# Patient Record
Sex: MALE | Race: WHITE | NOT HISPANIC OR LATINO | Employment: OTHER | URBAN - METROPOLITAN AREA
[De-identification: names, ages, dates, MRNs, and addresses within clinical notes are randomized per-mention and may not be internally consistent; named-entity substitution may affect disease eponyms.]

---

## 2018-06-06 ENCOUNTER — APPOINTMENT (EMERGENCY)
Dept: CT IMAGING | Facility: HOSPITAL | Age: 58
End: 2018-06-06
Payer: COMMERCIAL

## 2018-06-06 ENCOUNTER — HOSPITAL ENCOUNTER (OUTPATIENT)
Facility: HOSPITAL | Age: 58
Setting detail: OBSERVATION
Discharge: HOME | End: 2018-06-07
Attending: EMERGENCY MEDICINE | Admitting: INTERNAL MEDICINE
Payer: COMMERCIAL

## 2018-06-06 ENCOUNTER — APPOINTMENT (EMERGENCY)
Dept: RADIOLOGY | Facility: HOSPITAL | Age: 58
End: 2018-06-06
Payer: COMMERCIAL

## 2018-06-06 DIAGNOSIS — R94.31 ABNORMAL EKG: ICD-10-CM

## 2018-06-06 DIAGNOSIS — S29.9XXA TRAUMA OF CHEST, INITIAL ENCOUNTER: Primary | ICD-10-CM

## 2018-06-06 PROBLEM — I48.91 ATRIAL FIBRILLATION (HCC): Status: ACTIVE | Noted: 2018-06-06

## 2018-06-06 LAB
ANION GAP BLD CALC-SCNC: 13 MMOL/L (ref 4–13)
BUN BLD-MCNC: 34 MG/DL (ref 5–25)
CA-I BLD-SCNC: 1.16 MMOL/L (ref 1.12–1.32)
CHLORIDE BLD-SCNC: 102 MMOL/L (ref 100–108)
CREAT BLD-MCNC: 0.8 MG/DL (ref 0.6–1.3)
GFR SERPL CREATININE-BSD FRML MDRD: 98 ML/MIN/1.73SQ M
GLUCOSE SERPL-MCNC: 102 MG/DL (ref 65–140)
HCT VFR BLD CALC: 47 % (ref 36.5–49.3)
HGB BLDA-MCNC: 16 G/DL (ref 12–17)
PCO2 BLD: 31 MMOL/L (ref 21–32)
POTASSIUM BLD-SCNC: 5.1 MMOL/L (ref 3.5–5.3)
SODIUM BLD-SCNC: 139 MMOL/L (ref 136–145)
SPECIMEN SOURCE: ABNORMAL
SPECIMEN SOURCE: NORMAL
TROPONIN I BLD-MCNC: 0.01 NG/ML (ref 0–0.08)

## 2018-06-06 PROCEDURE — 84484 ASSAY OF TROPONIN QUANT: CPT

## 2018-06-06 PROCEDURE — 70450 CT HEAD/BRAIN W/O DYE: CPT

## 2018-06-06 PROCEDURE — 93005 ELECTROCARDIOGRAM TRACING: CPT

## 2018-06-06 PROCEDURE — 71260 CT THORAX DX C+: CPT

## 2018-06-06 PROCEDURE — 71045 X-RAY EXAM CHEST 1 VIEW: CPT

## 2018-06-06 PROCEDURE — 85014 HEMATOCRIT: CPT

## 2018-06-06 PROCEDURE — 74177 CT ABD & PELVIS W/CONTRAST: CPT

## 2018-06-06 PROCEDURE — 90715 TDAP VACCINE 7 YRS/> IM: CPT | Performed by: EMERGENCY MEDICINE

## 2018-06-06 PROCEDURE — 80047 BASIC METABLC PNL IONIZED CA: CPT

## 2018-06-06 RX ORDER — MORPHINE SULFATE 4 MG/ML
4 INJECTION, SOLUTION INTRAMUSCULAR; INTRAVENOUS ONCE
Status: DISCONTINUED | OUTPATIENT
Start: 2018-06-06 | End: 2018-06-07 | Stop reason: HOSPADM

## 2018-06-06 RX ADMIN — SODIUM CHLORIDE 1000 ML: 0.9 INJECTION, SOLUTION INTRAVENOUS at 20:57

## 2018-06-06 RX ADMIN — TETANUS TOXOID, REDUCED DIPHTHERIA TOXOID AND ACELLULAR PERTUSSIS VACCINE, ADSORBED 0.5 ML: 5; 2.5; 8; 8; 2.5 SUSPENSION INTRAMUSCULAR at 21:00

## 2018-06-06 RX ADMIN — IOHEXOL 100 ML: 350 INJECTION, SOLUTION INTRAVENOUS at 21:21

## 2018-06-07 ENCOUNTER — APPOINTMENT (OUTPATIENT)
Dept: NON INVASIVE DIAGNOSTICS | Facility: HOSPITAL | Age: 58
End: 2018-06-07
Payer: COMMERCIAL

## 2018-06-07 VITALS
BODY MASS INDEX: 25.88 KG/M2 | HEIGHT: 70 IN | SYSTOLIC BLOOD PRESSURE: 127 MMHG | RESPIRATION RATE: 18 BRPM | WEIGHT: 180.78 LBS | OXYGEN SATURATION: 96 % | HEART RATE: 58 BPM | DIASTOLIC BLOOD PRESSURE: 78 MMHG | TEMPERATURE: 98 F

## 2018-06-07 LAB
ANION GAP SERPL CALCULATED.3IONS-SCNC: 7 MMOL/L (ref 4–13)
ATRIAL RATE: 54 BPM
ATRIAL RATE: 66 BPM
BUN SERPL-MCNC: 21 MG/DL (ref 5–25)
CALCIUM SERPL-MCNC: 8.4 MG/DL (ref 8.3–10.1)
CHLORIDE SERPL-SCNC: 109 MMOL/L (ref 100–108)
CHOLEST SERPL-MCNC: 160 MG/DL (ref 50–200)
CO2 SERPL-SCNC: 28 MMOL/L (ref 21–32)
CREAT SERPL-MCNC: 0.82 MG/DL (ref 0.6–1.3)
GFR SERPL CREATININE-BSD FRML MDRD: 97 ML/MIN/1.73SQ M
GLUCOSE P FAST SERPL-MCNC: 127 MG/DL (ref 65–99)
GLUCOSE SERPL-MCNC: 127 MG/DL (ref 65–140)
HDLC SERPL-MCNC: 64 MG/DL (ref 40–60)
LDLC SERPL CALC-MCNC: 82 MG/DL (ref 0–100)
P AXIS: 29 DEGREES
P AXIS: 54 DEGREES
POTASSIUM SERPL-SCNC: 3.7 MMOL/L (ref 3.5–5.3)
PR INTERVAL: 142 MS
PR INTERVAL: 144 MS
QRS AXIS: -75 DEGREES
QRS AXIS: -78 DEGREES
QRSD INTERVAL: 116 MS
QRSD INTERVAL: 120 MS
QT INTERVAL: 416 MS
QT INTERVAL: 420 MS
QTC INTERVAL: 398 MS
QTC INTERVAL: 436 MS
SODIUM SERPL-SCNC: 144 MMOL/L (ref 136–145)
T WAVE AXIS: 27 DEGREES
T WAVE AXIS: 44 DEGREES
TRIGL SERPL-MCNC: 71 MG/DL
TROPONIN I SERPL-MCNC: <0.02 NG/ML
VENTRICULAR RATE: 54 BPM
VENTRICULAR RATE: 66 BPM

## 2018-06-07 PROCEDURE — 93010 ELECTROCARDIOGRAM REPORT: CPT | Performed by: INTERNAL MEDICINE

## 2018-06-07 PROCEDURE — 93005 ELECTROCARDIOGRAM TRACING: CPT

## 2018-06-07 PROCEDURE — 99236 HOSP IP/OBS SAME DATE HI 85: CPT | Performed by: INTERNAL MEDICINE

## 2018-06-07 PROCEDURE — 80048 BASIC METABOLIC PNL TOTAL CA: CPT | Performed by: NURSE PRACTITIONER

## 2018-06-07 PROCEDURE — 99285 EMERGENCY DEPT VISIT HI MDM: CPT

## 2018-06-07 PROCEDURE — 84484 ASSAY OF TROPONIN QUANT: CPT | Performed by: INTERNAL MEDICINE

## 2018-06-07 PROCEDURE — 80061 LIPID PANEL: CPT | Performed by: NURSE PRACTITIONER

## 2018-06-07 RX ORDER — ACETAMINOPHEN 325 MG/1
650 TABLET ORAL EVERY 4 HOURS PRN
Status: DISCONTINUED | OUTPATIENT
Start: 2018-06-07 | End: 2018-06-07 | Stop reason: HOSPADM

## 2018-06-07 RX ORDER — IBUPROFEN 200 MG
200 TABLET ORAL EVERY 6 HOURS PRN
Refills: 0
Start: 2018-06-07

## 2018-06-07 RX ORDER — OXYCODONE HYDROCHLORIDE AND ACETAMINOPHEN 5; 325 MG/1; MG/1
1 TABLET ORAL EVERY 4 HOURS PRN
Status: DISCONTINUED | OUTPATIENT
Start: 2018-06-07 | End: 2018-06-07 | Stop reason: HOSPADM

## 2018-06-07 RX ORDER — ONDANSETRON 2 MG/ML
4 INJECTION INTRAMUSCULAR; INTRAVENOUS EVERY 6 HOURS PRN
Status: DISCONTINUED | OUTPATIENT
Start: 2018-06-07 | End: 2018-06-07 | Stop reason: HOSPADM

## 2018-06-07 RX ADMIN — ACETAMINOPHEN 650 MG: 325 TABLET, FILM COATED ORAL at 01:07
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
## 2018-06-07 NOTE — TRAUMA DOCUMENTATION
Patient aware the doctor is awaiting to hear back from the trauma doctor    IVF infusing, call bell within reach
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
## 2018-06-07 NOTE — ASSESSMENT & PLAN NOTE
EKG shows incomplete right bundle branch block  Patient unsure if this is new  According to cardiology consult from a [facility] on 05/14/2016 patient had right bundle branch block at that time  Monitor on telemetry  EKG with chest pain or change in rhythm  Inpatient consult Cardiology  Will hold off on echo for now  [address] Cardiology input

## 2018-06-07 NOTE — ASSESSMENT & PLAN NOTE
No obvious injury noted to chest wall  Patient has tenderness to right ribs  Tylenol p r n  mild pain and Percocet p r n  moderate pain  Encouraged deep breathing

## 2018-06-07 NOTE — DISCHARGE SUMMARY
Discharge Summary  Louisa Preciado 59 y o  male MRN: [id number]  Unit/Bed#: [address] [place] [address] Encounter: [id number]    Admission Date:   Admission Orders     Ordered        06/06/18 2322  Place in Observation (expected length of stay for this patient is less than two midnights)  Once               Discharge Date: 06/07/18    Admitting Diagnosis: Abnormal EKG [R94 31]  H/O chest wall injury [Z87 828]  Trauma of chest, initial encounter [S29  9XXA]    HPI: see  H and P    Procedures Performed: No orders of the defined types were placed in this encounter  Hospital Course: Pt admitted for observationof chest wall trauma -R side of chest  Had RBBB-this is not an indication for admission  Significant Findings, Care, Treatment and Services Provided: none    Discharge Diagnosis: Principal Problem:    Abnormal EKG  Active Problems:    Chest wall injury, initial encounter    Atrial fibrillation ([facility] [address] )  Resolved Problems:    * No resolved hospital problems  *        Condition at Discharge: stable     Discharge instructions/Information to patient and family:   See after visit summary for information provided to patient and family  Provisions for Follow-Up Care:  See after visit summary for information related to follow-up care and any pertinent home health orders  Disposition: Home    Discharge Medications:  See after visit summary for reconciled discharge medications provided to patient and family  This note has been constructed using a voice recognition system         Reynaldo Jane MD
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
## 2018-06-07 NOTE — TRAUMA DOCUMENTATION
Pt resting; has no c/o  Pt and wife cold as room is very cold; warm blankets given to both and I informed them I would move him to a different room when one opened

## 2018-06-07 NOTE — SOCIAL WORK
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

## 2018-06-07 NOTE — PLAN OF CARE
CARDIOVASCULAR - ADULT     Absence of cardiac dysrhythmias or at baseline rhythm Progressing        DISCHARGE PLANNING     Discharge to home or other facility with appropriate resources Progressing        INFECTION - ADULT     Absence or prevention of progression during hospitalization Progressing     Absence of fever/infection during neutropenic period Progressing        Knowledge Deficit     Patient/family/caregiver demonstrates understanding of disease process, treatment plan, medications, and discharge instructions Progressing        MUSCULOSKELETAL - ADULT     Maintain or return mobility to safest level of function Progressing     Maintain proper alignment of affected body part Progressing        PAIN - ADULT     Verbalizes/displays adequate comfort level or baseline comfort level Progressing        SAFETY ADULT     Patient will remain free of falls Progressing     Maintain or return to baseline ADL function Progressing     Maintain or return mobility status to optimal level Progressing        SKIN/TISSUE INTEGRITY - ADULT     Incision(s), wounds(s) or drain site(s) healing without S/S of infection Progressing

## 2020-11-17 ENCOUNTER — NURSE TRIAGE (OUTPATIENT)
Dept: OTHER | Facility: OTHER | Age: 60
End: 2020-11-17

## 2020-11-17 DIAGNOSIS — Z20.828 SARS-ASSOCIATED CORONAVIRUS EXPOSURE: ICD-10-CM

## 2020-11-17 DIAGNOSIS — Z20.828 SARS-ASSOCIATED CORONAVIRUS EXPOSURE: Primary | ICD-10-CM

## 2020-11-17 PROCEDURE — U0003 INFECTIOUS AGENT DETECTION BY NUCLEIC ACID (DNA OR RNA); SEVERE ACUTE RESPIRATORY SYNDROME CORONAVIRUS 2 (SARS-COV-2) (CORONAVIRUS DISEASE [COVID-19]), AMPLIFIED PROBE TECHNIQUE, MAKING USE OF HIGH THROUGHPUT TECHNOLOGIES AS DESCRIBED BY CMS-2020-01-R: HCPCS | Performed by: FAMILY MEDICINE

## 2020-11-18 LAB — SARS-COV-2 RNA SPEC QL NAA+PROBE: NOT DETECTED

## 2021-02-12 ENCOUNTER — OFFICE VISIT (OUTPATIENT)
Dept: CARDIOLOGY CLINIC | Facility: CLINIC | Age: 61
End: 2021-02-12
Payer: COMMERCIAL

## 2021-02-12 VITALS
SYSTOLIC BLOOD PRESSURE: 120 MMHG | BODY MASS INDEX: 25.91 KG/M2 | WEIGHT: 181 LBS | DIASTOLIC BLOOD PRESSURE: 80 MMHG | HEIGHT: 70 IN | HEART RATE: 68 BPM

## 2021-02-12 DIAGNOSIS — I48.0 PAROXYSMAL ATRIAL FIBRILLATION (HCC): Primary | ICD-10-CM

## 2021-02-12 PROCEDURE — 93000 ELECTROCARDIOGRAM COMPLETE: CPT | Performed by: INTERNAL MEDICINE

## 2021-02-12 PROCEDURE — 99244 OFF/OP CNSLTJ NEW/EST MOD 40: CPT | Performed by: INTERNAL MEDICINE

## 2021-02-12 RX ORDER — DILTIAZEM HYDROCHLORIDE 120 MG/1
120 CAPSULE, COATED, EXTENDED RELEASE ORAL DAILY
COMMUNITY
Start: 2021-01-24 | End: 2021-02-12 | Stop reason: ALTCHOICE

## 2021-02-12 NOTE — PATIENT INSTRUCTIONS
A-fib (Atrial Fibrillation)   AMBULATORY CARE:   Atrial fibrillation (a-fib)  is an irregular heartbeat  It reduces your heart's ability to pump blood through your body  A-fib may come and go, or it may be a long-term condition  A-fib can cause life-threatening blood clots, stroke, or heart failure  It is important to treat and manage a-fib to help prevent these problems  Common signs and symptoms include the following:   · A heartbeat that races, pounds, or flutters    · Weakness, severe tiredness, or confusion    · Feeling lightheaded, sweaty, dizzy, or faint    · Shortness of breath or anxiety    · Chest pain or pressure    Call your local emergency number (911 in the [address] [facility]) if:   · You have any of the following signs of a heart attack:      ? Squeezing, pressure, or pain in your chest    ? You may  also have any of the following:     ? Discomfort or pain in your back, neck, jaw, stomach, or arm    ? Shortness of breath    ? Nausea or vomiting    ? Lightheadedness or a sudden cold sweat    · You have any of the following signs of a stroke:      ? Numbness or drooping on one side of your face     ? Weakness in an arm or leg    ? Confusion or difficulty speaking    ? Dizziness, a severe headache, or vision loss    Call your cardiologist if:   · Your arm or leg feels warm, tender, and painful  It may look swollen and red  · Your heart rate is more than 110 beats per minute  · You have new or worsening swelling in your legs, feet, ankles, or abdomen  · You are short of breath, even at rest      · You have questions or concerns about your condition or care  Treatment for A-fib:  Conditions that cause a-fib, such as thyroid disease, will be treated  You may also need any of the following:  · Heart medicines  help control your heart rate or rhythm  You may need more than one medicine to treat your symptoms  · Antiplatelet or blood thinner medicines  help prevent blood clots and stroke  · Cardioversion  is a procedure to return your heart rate and rhythm to normal  It can be done using medicines or electric shock  · A-fib ablation  is a procedure that uses energy to burn a small area of heart tissue  This creates scar tissue and prevents electrical signals that cause a-fib  You may need this procedure more than once  Ask for more information on a-fib ablation  · A pacemaker  may be inserted into your heart  A pacemaker is a device that controls your heartbeat  A pacemaker may be inserted during an ablation procedure or surgery  Ask your healthcare provider for more information on pacemakers  · Surgery  may be needed if other procedures do not work  During surgery your healthcare provider will make cuts in the upper part of your heart  The provider will stitch the cuts together to create scar tissue  The scar tissue will prevent electrical signals that cause a-fib  Manage A-fib:   · Know your target heart rate  Learn how to check your pulse and monitor your heart rate  · Know the risks if you choose to drink alcohol  Alcohol can make a-fib hard to manage  Ask your healthcare provider if it is safe for you to drink alcohol  A drink of alcohol is 12 ounces of beer, 5 ounces of wine, or 1½ ounces of liquor  · Do not smoke  Nicotine can cause heart damage and make it more difficult to manage your a-fib  Do not use e-cigarettes or smokeless tobacco in place of cigarettes or to help you quit  They still contain nicotine  Ask your healthcare provider for information if you currently smoke and need help quitting  · Eat heart-healthy foods  Heart healthy foods will help keep your cholesterol low  These include fruits, vegetables, whole-grain breads, low-fat dairy products, beans, lean meats, and fish  Replace butter and margarine with heart-healthy oils such as olive oil and canola oil  · Maintain a healthy weight  Ask your healthcare provider how much you should weigh  Ask him or her to help you create a safe weight loss plan if you are overweight  Even a small goal of a 10% weight loss can improve your heart health  · Get regular physical activity  Physical activity helps improve your heart health  Get at least 150 minutes of moderate aerobic physical activity each week  Your healthcare provider can help you create an activity plan  · Manage other health conditions  This includes high blood pressure or cholesterol, sleep apnea, diabetes, and other heart conditions  Take medicine as directed and follow your treatment plan  Follow up with your cardiologist as directed: You will need regular blood tests and monitoring  Write down your questions so you remember to ask them during your visits  © Copyright [address] Information is for End User's use only and may not be sold, redistributed or otherwise used for commercial purposes  All illustrations and images included in CareNotes® are the copyrighted property of [employer]  or [facility] Markell Stoddard   The above information is an  only  It is not intended as medical advice for individual conditions or treatments  Talk to your doctor, nurse or pharmacist before following any medical regimen to see if it is safe and effective for you

## 2021-02-12 NOTE — PROGRESS NOTES
Cardiology Consultation     Christian Flores  [id number]  1960  [facility] [address] [id number] [phone]    1  Paroxysmal atrial fibrillation (HCC)  POCT ECG    AMB extended holter monitor      Chief Complaint   Patient presents with    Follow-up     was in the hospital with Shruti 1/21/21     HPI: Patient was in the hospital in Jan 2021 for Shruti, and had recurrence of afib  Now on cardizem  He had afib 3 years ago in the setting of trauma and also about 2 weeks prior to COVID infection lasting about 1/2 an hour  Patient feels well, without complaints  No reported chest pain, shortness of breath, palpitations, lightheadedness, syncope, LE edema, orthopnea, PND, or significant weight changes  Patient remains active without any increased fatigue out of the ordinary        Patient Active Problem List   Diagnosis    Abnormal EKG    Chest wall injury, initial encounter    Atrial fibrillation [facility])     Past Medical History:   Diagnosis Date    Atrial fibrillation [facility])      Social History     Socioeconomic History    Marital status: /Civil Union     Spouse name: Not on file    Number of children: Not on file    Years of education: Not on file    Highest education level: Not on file   Occupational History    Not on file   Social Needs    Financial resource strain: Not on file    Food insecurity     Worry: Not on file     Inability: Not on file   [employer] needs     Medical: Not on file     Non-medical: Not on file   Tobacco Use    Smoking status: Never Smoker    Smokeless tobacco: Never Used   Substance and Sexual Activity    Alcohol use: No    Drug use: No    Sexual activity: Not on file   Lifestyle    Physical activity     Days per week: Not on file     Minutes per session: Not on file    Stress: Not on file   Relationships    Social connections Talks on phone: Not on file     Gets together: Not on file     Attends Yazidism service: Not on file     Active member of club or organization: Not on file     Attends meetings of clubs or organizations: Not on file     Relationship status: Not on file    Intimate partner violence     Fear of current or ex partner: Not on file     Emotionally abused: Not on file     Physically abused: Not on file     Forced sexual activity: Not on file   Other Topics Concern    Not on file   Social History Narrative    Not on file      History reviewed  No pertinent family history  Past Surgical History:   Procedure Laterality Date    HERNIA REPAIR         Current Outpatient Medications:     ASPIRIN 81 PO, Take 81 mg by mouth daily, Disp: , Rfl:     ibuprofen (MOTRIN) 200 mg tablet, Take 1 tablet (200 mg total) by mouth every 6 (six) hours as needed for mild pain (Patient not taking: Reported on 2/12/2021), Disp: , Rfl: 0  No Known Allergies  Vitals:    02/12/21 1441   BP: 120/80   BP Location: Left arm   Patient Position: Sitting   Cuff Size: Standard   Pulse: 68   Weight: 82 1 kg (181 lb)   Height: 5' 10" (1 778 m)       Labs:  Orders Only on 11/17/2020   Component Date Value    SARS-CoV-2  11/17/2020 Not Detected      Lab Results   Component Value Date    TRIG 71 06/07/2018    HDL 64 (H) 06/07/2018     Imaging: No results found  Review of Systems:  Review of Systems   Constitutional: Negative for activity change, appetite change, fatigue and fever  HENT: Negative for nosebleeds and sore throat  Eyes: Negative for photophobia and visual disturbance  Respiratory: Negative for cough, chest tightness, shortness of breath and wheezing  Cardiovascular: Negative for chest pain, palpitations and leg swelling  Gastrointestinal: Negative for abdominal pain, diarrhea, nausea and vomiting  Endocrine: Negative for polyuria  Genitourinary: Negative for dysuria, frequency and hematuria     Musculoskeletal: Negative for arthralgias, back pain and gait problem  Skin: Negative for pallor and rash  Neurological: Negative for dizziness, syncope, speech difficulty and light-headedness  Hematological: Does not bruise/bleed easily  Psychiatric/Behavioral: Negative for agitation, behavioral problems and confusion  Physical Exam:  Physical Exam  Vitals signs reviewed  Constitutional:       General: He is not in acute distress  Appearance: He is well-developed  He is not diaphoretic  HENT:      Head: Normocephalic and atraumatic  Nose: Nose normal    Eyes:      General: No scleral icterus  Pupils: Pupils are equal, round, and reactive to light  Neck:      Musculoskeletal: Normal range of motion and neck supple  Vascular: No JVD  Cardiovascular:      Rate and Rhythm: Normal rate and regular rhythm  Heart sounds: S1 normal and S2 normal  Heart sounds not distant  No murmur  No systolic murmur  No friction rub  No gallop  No S3 sounds  Pulmonary:      Effort: Pulmonary effort is normal  No respiratory distress  Breath sounds: Normal breath sounds  No wheezing or rales  Abdominal:      General: Bowel sounds are normal  There is no distension  Palpations: Abdomen is soft  Musculoskeletal:         General: No deformity  Skin:     General: Skin is warm and dry  Findings: No erythema  Neurological:      Mental Status: He is alert and oriented to person, place, and time  Cranial Nerves: No cranial nerve deficit  Psychiatric:         Behavior: Behavior normal        Blood pressure 120/80, pulse 68, height 5' 10" (1 778 m), weight 82 1 kg (181 lb)  EKG:  Normal sinus rhythm  Right bundle branch block  Left anterior fascicular block  Possible lateral infarct, age undetermined  Abnormal ECG    Discussion/Summary:  Afib: had a history of it, then recurred in the setting of COVID infection  Currently on cardizem for rate control, but not on AC  CHADS2-Vasc is 0    Will check Zio patch monitor to assess for recurrence of afib  Will stop cardizem for the time being to see response off of this on monitor  EF normal on echo in Jan 2021

## 2021-03-22 ENCOUNTER — CLINICAL SUPPORT (OUTPATIENT)
Dept: CARDIOLOGY CLINIC | Facility: CLINIC | Age: 61
End: 2021-03-22
Payer: COMMERCIAL

## 2021-03-22 DIAGNOSIS — I48.0 PAROXYSMAL ATRIAL FIBRILLATION (HCC): ICD-10-CM

## 2021-03-22 PROCEDURE — 93248 EXT ECG>7D<15D REV&INTERPJ: CPT | Performed by: INTERNAL MEDICINE

## 2021-04-23 ENCOUNTER — OFFICE VISIT (OUTPATIENT)
Dept: CARDIOLOGY CLINIC | Facility: CLINIC | Age: 61
End: 2021-04-23
Payer: COMMERCIAL

## 2021-04-23 VITALS
DIASTOLIC BLOOD PRESSURE: 80 MMHG | SYSTOLIC BLOOD PRESSURE: 110 MMHG | HEART RATE: 75 BPM | WEIGHT: 183 LBS | BODY MASS INDEX: 26.2 KG/M2 | HEIGHT: 70 IN

## 2021-04-23 DIAGNOSIS — R94.31 ABNORMAL EKG: ICD-10-CM

## 2021-04-23 DIAGNOSIS — I48.0 PAROXYSMAL ATRIAL FIBRILLATION (HCC): Primary | ICD-10-CM

## 2021-04-23 PROCEDURE — 99214 OFFICE O/P EST MOD 30 MIN: CPT | Performed by: INTERNAL MEDICINE

## 2021-04-23 NOTE — PATIENT INSTRUCTIONS
A-fib (Atrial Fibrillation)   AMBULATORY CARE:   Atrial fibrillation (a-fib)  is an irregular heartbeat  It reduces your heart's ability to pump blood through your body  A-fib may come and go, or it may be a long-term condition  A-fib can cause life-threatening blood clots, stroke, or heart failure  It is important to treat and manage a-fib to help prevent these problems  Common signs and symptoms include the following:   · A heartbeat that races, pounds, or flutters    · Weakness, severe tiredness, or confusion    · Feeling lightheaded, sweaty, dizzy, or faint    · Shortness of breath or anxiety    · Chest pain or pressure    Call your local emergency number (911 in the [facility]) if:   · You have any of the following signs of a heart attack:      ? Squeezing, pressure, or pain in your chest    ? You may  also have any of the following:     ? Discomfort or pain in your back, neck, jaw, stomach, or arm    ? Shortness of breath    ? Nausea or vomiting    ? Lightheadedness or a sudden cold sweat    · You have any of the following signs of a stroke:      ? Numbness or drooping on one side of your face     ? Weakness in an arm or leg    ? Confusion or difficulty speaking    ? Dizziness, a severe headache, or vision loss    Call your cardiologist if:   · Your arm or leg feels warm, tender, and painful  It may look swollen and red  · Your heart rate is more than 110 beats per minute  · You have new or worsening swelling in your legs, feet, ankles, or abdomen  · You are short of breath, even at rest      · You have questions or concerns about your condition or care  Treatment for A-fib:  Conditions that cause a-fib, such as thyroid disease, will be treated  You may also need any of the following:  · Heart medicines  help control your heart rate or rhythm  You may need more than one medicine to treat your symptoms  · Antiplatelet or blood thinner medicines  help prevent blood clots and stroke  · Cardioversion  is a procedure to return your heart rate and rhythm to normal  It can be done using medicines or electric shock  · A-fib ablation  is a procedure that uses energy to burn a small area of heart tissue  This creates scar tissue and prevents electrical signals that cause a-fib  You may need this procedure more than once  Ask for more information on a-fib ablation  · A pacemaker  may be inserted into your heart  A pacemaker is a device that controls your heartbeat  A pacemaker may be inserted during an ablation procedure or surgery  Ask your healthcare provider for more information on pacemakers  · Surgery  may be needed if other procedures do not work  During surgery your healthcare provider will make cuts in the upper part of your heart  The provider will stitch the cuts together to create scar tissue  The scar tissue will prevent electrical signals that cause a-fib  Manage A-fib:   · Know your target heart rate  Learn how to check your pulse and monitor your heart rate  · Know the risks if you choose to drink alcohol  Alcohol can make a-fib hard to manage  Ask your healthcare provider if it is safe for you to drink alcohol  A drink of alcohol is 12 ounces of beer, 5 ounces of wine, or 1½ ounces of liquor  · Do not smoke  Nicotine can cause heart damage and make it more difficult to manage your a-fib  Do not use e-cigarettes or smokeless tobacco in place of cigarettes or to help you quit  They still contain nicotine  Ask your healthcare provider for information if you currently smoke and need help quitting  · Eat heart-healthy foods  Heart healthy foods will help keep your cholesterol low  These include fruits, vegetables, whole-grain breads, low-fat dairy products, beans, lean meats, and fish  Replace butter and margarine with heart-healthy oils such as olive oil and canola oil  · Maintain a healthy weight  Ask your healthcare provider how much you should weigh  Ask him or her to help you create a safe weight loss plan if you are overweight  Even a small goal of a 10% weight loss can improve your heart health  · Get regular physical activity  Physical activity helps improve your heart health  Get at least 150 minutes of moderate aerobic physical activity each week  Your healthcare provider can help you create an activity plan  · Manage other health conditions  This includes high blood pressure or cholesterol, sleep apnea, diabetes, and other heart conditions  Take medicine as directed and follow your treatment plan  Follow up with your cardiologist as directed: You will need regular blood tests and monitoring  Write down your questions so you remember to ask them during your visits  © Copyright [address] Information is for End User's use only and may not be sold, redistributed or otherwise used for commercial purposes  All illustrations and images included in CareNotes® are the copyrighted property of [employer]  or [facility] Markell Stoddard   The above information is an  only  It is not intended as medical advice for individual conditions or treatments  Talk to your doctor, nurse or pharmacist before following any medical regimen to see if it is safe and effective for you

## 2021-04-23 NOTE — PROGRESS NOTES
Cardiology Consultation     Severo Samayoa  [id number]  1960  [facility] [address] [address] [phone]    1  Paroxysmal atrial fibrillation (HCC)     2  Abnormal EKG        Chief Complaint   Patient presents with    Follow-up     6 week     HPI: Patient was in the hospital in Jan 2021 for Shruti, and had recurrence of afib  Was discharged on cardizem  He had afib 3 years ago in the setting of trauma and also prior to COVID infection lasting about 1/2 an hour  Patient feels well, without complaints  No reported chest pain, shortness of breath, palpitations, lightheadedness, syncope, LE edema, orthopnea, PND, or significant weight changes  Patient remains active without any increased fatigue out of the ordinary        Patient Active Problem List   Diagnosis    Abnormal EKG    Chest wall injury, initial encounter    Atrial fibrillation [facility])     Past Medical History:   Diagnosis Date    Atrial fibrillation [facility])      Social History     Socioeconomic History    Marital status: /Civil Union     Spouse name: Not on file    Number of children: Not on file    Years of education: Not on file    Highest education level: Not on file   Occupational History    Not on file   Social Needs    Financial resource strain: Not on file    Food insecurity     Worry: Not on file     Inability: Not on file   [employer] needs     Medical: Not on file     Non-medical: Not on file   Tobacco Use    Smoking status: Never Smoker    Smokeless tobacco: Never Used   Substance and Sexual Activity    Alcohol use: No    Drug use: No    Sexual activity: Not on file   Lifestyle    Physical activity     Days per week: Not on file     Minutes per session: Not on file    Stress: Not on file   Relationships    Social connections     Talks on phone: Not on file     Gets together: Not on file     Attends Orthodoxy service: Not on file     Active member of club or organization: Not on file     Attends meetings of clubs or organizations: Not on file     Relationship status: Not on file    Intimate partner violence     Fear of current or ex partner: Not on file     Emotionally abused: Not on file     Physically abused: Not on file     Forced sexual activity: Not on file   Other Topics Concern    Not on file   Social History Narrative    Not on file      No family history on file  Past Surgical History:   Procedure Laterality Date    HERNIA REPAIR         Current Outpatient Medications:     ASPIRIN 81 PO, Take 81 mg by mouth daily, Disp: , Rfl:     ibuprofen (MOTRIN) 200 mg tablet, Take 1 tablet (200 mg total) by mouth every 6 (six) hours as needed for mild pain (Patient not taking: Reported on 2/12/2021), Disp: , Rfl: 0  No Known Allergies  Vitals:    04/23/21 1440   BP: 110/80   BP Location: Left arm   Patient Position: Sitting   Cuff Size: Standard   Pulse: 75   Weight: 83 kg (183 lb)   Height: 5' 10" (1 778 m)       Labs:  Orders Only on 11/17/2020   Component Date Value    SARS-CoV-2  11/17/2020 Not Detected      Lab Results   Component Value Date    TRIG 71 06/07/2018    HDL 64 (H) 06/07/2018     Imaging: No results found  Review of Systems:  Review of Systems   Constitutional: Negative for activity change, appetite change, fatigue and fever  HENT: Negative for nosebleeds and sore throat  Eyes: Negative for photophobia and visual disturbance  Respiratory: Negative for cough, chest tightness, shortness of breath and wheezing  Cardiovascular: Negative for chest pain, palpitations and leg swelling  Gastrointestinal: Negative for abdominal pain, diarrhea, nausea and vomiting  Endocrine: Negative for polyuria  Genitourinary: Negative for dysuria, frequency and hematuria  Musculoskeletal: Negative for arthralgias, back pain and gait problem     Skin: Negative for pallor and rash    Neurological: Negative for dizziness, syncope, speech difficulty and light-headedness  Hematological: Does not bruise/bleed easily  Psychiatric/Behavioral: Negative for agitation, behavioral problems and confusion  Physical Exam:  Physical Exam  Vitals signs reviewed  Constitutional:       General: He is not in acute distress  Appearance: He is well-developed  He is not diaphoretic  HENT:      Head: Normocephalic and atraumatic  Nose: Nose normal    Eyes:      General: No scleral icterus  Pupils: Pupils are equal, round, and reactive to light  Neck:      Musculoskeletal: Normal range of motion and neck supple  Vascular: No JVD  Cardiovascular:      Rate and Rhythm: Normal rate and regular rhythm  Heart sounds: S1 normal and S2 normal  Heart sounds not distant  No murmur  No systolic murmur  No friction rub  No gallop  No S3 sounds  Pulmonary:      Effort: Pulmonary effort is normal  No respiratory distress  Breath sounds: Normal breath sounds  No wheezing or rales  Abdominal:      General: Bowel sounds are normal  There is no distension  Palpations: Abdomen is soft  Musculoskeletal:         General: No deformity  Skin:     General: Skin is warm and dry  Findings: No erythema  Neurological:      Mental Status: He is alert and oriented to person, place, and time  Cranial Nerves: No cranial nerve deficit  Psychiatric:         Behavior: Behavior normal        Blood pressure 110/80, pulse 75, height 5' 10" (1 778 m), weight 83 kg (183 lb)  EKG:  Normal sinus rhythm  Right bundle branch block  Left anterior fascicular block  Possible lateral infarct, age undetermined  Abnormal ECG    Discussion/Summary:  Afib: had a history of it, then recurred in the setting of COVID infection  Currently on cardizem for rate control, but not on AC  CHADS2-Vasc is 0  We checked Zio patch monitor to assess for recurrence of afib, which he did not have  We stopped cardizem for the time being to see response off of this on monitor, and no still no recurrence  EF normal on echo in Jan 2021  Follow clinically for recurrence